# Patient Record
Sex: MALE | Race: WHITE | NOT HISPANIC OR LATINO | Employment: FULL TIME | URBAN - METROPOLITAN AREA
[De-identification: names, ages, dates, MRNs, and addresses within clinical notes are randomized per-mention and may not be internally consistent; named-entity substitution may affect disease eponyms.]

---

## 2018-10-05 ENCOUNTER — IMMUNIZATION (OUTPATIENT)
Dept: SOCIAL WORK | Facility: CLINIC | Age: 48
End: 2018-10-05
Payer: COMMERCIAL

## 2018-10-05 DIAGNOSIS — Z23 NEED FOR VACCINATION: ICD-10-CM

## 2018-10-05 PROCEDURE — 90471 IMMUNIZATION ADMIN: CPT | Performed by: REGISTERED NURSE

## 2018-10-05 PROCEDURE — 90686 IIV4 VACC NO PRSV 0.5 ML IM: CPT | Performed by: REGISTERED NURSE

## 2019-05-15 ENCOUNTER — OFFICE VISIT (OUTPATIENT)
Dept: URGENT CARE | Facility: PHYSICIAN GROUP | Age: 49
End: 2019-05-15
Payer: COMMERCIAL

## 2019-05-15 VITALS
TEMPERATURE: 99 F | HEIGHT: 67 IN | BODY MASS INDEX: 30.45 KG/M2 | DIASTOLIC BLOOD PRESSURE: 82 MMHG | OXYGEN SATURATION: 95 % | WEIGHT: 194 LBS | SYSTOLIC BLOOD PRESSURE: 124 MMHG | HEART RATE: 74 BPM

## 2019-05-15 DIAGNOSIS — J03.00 STREP TONSILLITIS: ICD-10-CM

## 2019-05-15 LAB
INT CON NEG: NORMAL
INT CON POS: NORMAL
S PYO AG THROAT QL: POSITIVE

## 2019-05-15 PROCEDURE — 99204 OFFICE O/P NEW MOD 45 MIN: CPT | Performed by: PHYSICIAN ASSISTANT

## 2019-05-15 PROCEDURE — 87880 STREP A ASSAY W/OPTIC: CPT | Performed by: PHYSICIAN ASSISTANT

## 2019-05-15 RX ORDER — AMOXICILLIN 875 MG/1
875 TABLET, COATED ORAL 2 TIMES DAILY
Qty: 20 TAB | Refills: 0 | Status: SHIPPED | OUTPATIENT
Start: 2019-05-15

## 2019-05-15 NOTE — PROGRESS NOTES
Chief Complaint   Patient presents with   • Pharyngitis     Body aches, headache, fatigued, ears are ringing x 2 days        HISTORY OF PRESENT ILLNESS: Patient is a 48 y.o. male who presents today for about 36 hours of worsening sore throat. Diffuse, aching and worse with swallowing.  He has had associated fatigue, body aches, chills, and malaise.  He has had some fever up to 101, most recently last night.  He has taken OTC with mild relief. No nausea, vomiting or rashes.  No cough.   No sick contacts.     Patient Active Problem List    Diagnosis Date Noted   • PVC (premature ventricular contraction) 09/22/2014   • Other chest pain 02/03/2014   • Palpitations 02/03/2014   • Essential hypertension, benign 02/03/2014   • Nonspecific abnormal electrocardiogram (ECG) (EKG) 02/03/2014       Allergies:Patient has no known allergies.    Current Outpatient Prescriptions Ordered in [facility]   Medication Sig Dispense Refill   • ibuprofen (MOTRIN) 200 MG TABS Take 200 mg by mouth every 6 hours as needed.     • Multiple Vitamin (MULTI-VITAMIN PO) Take  by mouth every 48 hours.     • GLUCOSAMINE-CHONDROITIN PO Take  by mouth every day.       No current [facility]-ordered facility-administered medications on file.        Past Medical History:   Diagnosis Date   • Hypertension    • Pain     neck and left elbow       Social History   Substance Use Topics   • Smoking status: Never Smoker   • Smokeless tobacco: Not on file   • Alcohol use 1.5 oz/week     3 drink(s) per week       No family status information on file.     Family History   Problem Relation Age of Onset   • Heart Attack Neg Hx        ROS:  Review of Systems   Constitutional: SEE HPI    HENT: SEE HPI   Eyes: Negative for blurred vision.   Respiratory: Negative for cough, sputum production, shortness of breath and wheezing.    Cardiovascular: Negative for chest pain, palpitations, orthopnea and leg swelling.   Gastrointestinal: Negative for heartburn, nausea, vomiting and  "abdominal pain.   .     Exam:  /82 (BP Location: Left arm, Patient Position: Supine, BP Cuff Size: Adult)   Pulse 74   Temp 37.2 °C (99 °F) (Temporal)   Ht 1.702 m (5' 7\")   Wt 88 kg (194 lb)   SpO2 95%   General:  Well nourished, well developed male in NAD  Head: Normocephalic/atraumatic.   Eyes: PERRLA, EOM within normal limits, no conjunctival injection, no scleral icterus, visual fields and acuity grossly intact.  Ears: Normal shape and symmetry, no tenderness, no discharge. External canals are without any significant edema or erythema. Tympanic membranes are without any inflammation, no effusion. Gross auditory acuity is intact  Nose: Symmetrical, sinuses without tenderness, no discharge.   Mouth: reasonable hygiene,  Moderate diffuse erythema, bilateral exudates, moderate bilateral tonsillar enlargement. Uvula midline.  No evidence of abscess  Neck: no masses, range of motion within normal limits, no tracheal deviation. Mild anterior cervical lymphadenopathy  Pulmonary: Normal respiratory effort, no wheezes, crackles, or rhonchi.  Cardiovascular: regular rate and rhythm without murmurs, rubs, or gallops.  Skin: No visible rashes or lesion. Warm, pink, dry.   Extremities: no clubbing, cyanosis, or edema.  Neuro: A&O x 3. Speech normal/clear.  Normal gait.   Psych: normal mood/affect      Assessment/Plan:  1. Strep tonsillitis  POCT Rapid Strep A    amoxicillin (AMOXIL) 875 MG tablet         -POCT strep -POS  -fluids emphasized. Alternating Tylenol/Motrin prn pain/inflammation/fever  -new tooth brush.    -RTC precautions discussed such as worsening sore throat despite abx, worsening fevers, increased difficulty swallowing or breathing, drooling, etc.        Supportive care, differential diagnoses, and indications for immediate follow-up discussed with patient.   Pathogenesis of diagnosis discussed including typical length and natural progression.   Instructed to return to clinic or nearest emergency " department for any change in condition, further concerns, or worsening of symptoms.  Patient states understanding of the plan of care and discharge instructions.  Instructed to make an appointment, for follow up, with their primary care provider.      Arline Duarte P.A.-C.

## 2021-07-06 ENCOUNTER — OFFICE VISIT (OUTPATIENT)
Dept: URGENT CARE | Facility: PHYSICIAN GROUP | Age: 51
End: 2021-07-06
Payer: COMMERCIAL

## 2021-07-06 VITALS
BODY MASS INDEX: 30.45 KG/M2 | SYSTOLIC BLOOD PRESSURE: 118 MMHG | DIASTOLIC BLOOD PRESSURE: 72 MMHG | RESPIRATION RATE: 18 BRPM | HEIGHT: 67 IN | TEMPERATURE: 97.3 F | WEIGHT: 194 LBS | HEART RATE: 91 BPM | OXYGEN SATURATION: 95 %

## 2021-07-06 DIAGNOSIS — J06.9 UPPER RESPIRATORY TRACT INFECTION, UNSPECIFIED TYPE: ICD-10-CM

## 2021-07-06 LAB
INT CON NEG: NEGATIVE
INT CON POS: POSITIVE
S PYO AG THROAT QL: NEGATIVE

## 2021-07-06 PROCEDURE — 87880 STREP A ASSAY W/OPTIC: CPT | Performed by: PHYSICIAN ASSISTANT

## 2021-07-06 PROCEDURE — 99213 OFFICE O/P EST LOW 20 MIN: CPT | Performed by: PHYSICIAN ASSISTANT

## 2021-07-06 RX ORDER — AZELASTINE 1 MG/ML
1 SPRAY, METERED NASAL 2 TIMES DAILY
Qty: 30 ML | Refills: 0 | Status: SHIPPED | OUTPATIENT
Start: 2021-07-06 | End: 2021-07-16

## 2021-07-06 ASSESSMENT — ENCOUNTER SYMPTOMS
COUGH: 1
SINUS PAIN: 1
SORE THROAT: 1
CHILLS: 0
FEVER: 0
SPUTUM PRODUCTION: 1
PALPITATIONS: 0
SHORTNESS OF BREATH: 0
HEADACHES: 1
WHEEZING: 0
HEMOPTYSIS: 0

## 2021-07-06 NOTE — PATIENT INSTRUCTIONS

## 2021-07-06 NOTE — PROGRESS NOTES
Subjective:   Ford Michel is a 51 y.o. male who presents for Sinus Pain (pt states febrile, sore throat,congestion, cough, mucus, sinus pressure x5 days)      Sinus Pain  This is a new problem. The current episode started in the past 7 days. The problem occurs constantly. Associated symptoms include congestion, coughing, headaches and a sore throat. Pertinent negatives include no chest pain, chills or fever. Nothing aggravates the symptoms. He has tried nothing for the symptoms.       Review of Systems   Constitutional: Positive for malaise/fatigue. Negative for chills and fever.   HENT: Positive for congestion, sinus pain and sore throat. Negative for ear pain.    Respiratory: Positive for cough and sputum production. Negative for hemoptysis, shortness of breath and wheezing.    Cardiovascular: Negative for chest pain and palpitations.   Neurological: Positive for headaches.   All other systems reviewed and are negative.      Medications:    • amoxicillin  • azelastine  • GLUCOSAMINE-CHONDROITIN PO  • ibuprofen Tabs  • MULTI-VITAMIN PO    Allergies: Patient has no known allergies.    Problem List: Ford Michel does not have any pertinent problems on file.    Surgical History:  Past Surgical History:   Procedure Laterality Date   • EXTENSOR TENDON REPAIR  4/26/2012    Performed by REYMUNDO HOLDER at [facility]   • LOOSE BODY REMOVAL  4/26/2012    Performed by REYMUNDO HOLDER at [facility]   • BLOCK TRIGGER POINT  2012   • OTHER  2009    right elbow surgery   • ELBOW ARTHROSCOPY         Past Social Hx: Ford Michel  reports that he has never smoked. He has never used smokeless tobacco. He reports current alcohol use of about 1.5 oz of alcohol per week. He reports that he does not use drugs.     Past Family Hx:  Ford Michel family history is not on file.     Problem list, medications, and allergies reviewed by myself today in Epic.     Objective:     Blood  "Pressure 118/72   Pulse 91   Temperature 36.3 °C (97.3 °F)   Respiration 18   Height 1.702 m (5' 7\")   Weight 88 kg (194 lb)   Oxygen Saturation 95%   Body Mass Index 30.38 kg/m²     Physical Exam  Vitals reviewed.   Constitutional:       General: He is not in acute distress.     Appearance: He is well-developed. He is not ill-appearing or toxic-appearing.      Interventions: He is not intubated.  HENT:      Head: Normocephalic and atraumatic.      Right Ear: Hearing, tympanic membrane, ear canal and external ear normal.      Left Ear: Hearing, tympanic membrane, ear canal and external ear normal.      Nose: Nose normal.      Mouth/Throat:      Pharynx: Uvula midline.   Eyes:      General: Lids are normal.      Conjunctiva/sclera: Conjunctivae normal.   Cardiovascular:      Rate and Rhythm: Regular rhythm.      Heart sounds: Normal heart sounds, S1 normal and S2 normal. No murmur heard.   No friction rub. No gallop.    Pulmonary:      Effort: Pulmonary effort is normal. No tachypnea, bradypnea, accessory muscle usage or respiratory distress. He is not intubated.      Breath sounds: Normal breath sounds. No decreased breath sounds, wheezing, rhonchi or rales.   Chest:      Chest wall: No tenderness.   Musculoskeletal:         General: Normal range of motion.      Cervical back: Full passive range of motion without pain, normal range of motion and neck supple.   Skin:     General: Skin is warm and dry.   Neurological:      Mental Status: He is alert and oriented to person, place, and time.   Psychiatric:         Speech: Speech normal.         Behavior: Behavior normal.         Thought Content: Thought content normal.         Judgment: Judgment normal.     Rapid Strep: NEG      Assessment/Plan:     Medical Decision Making/Comments     Pt is a  51 yr old male who presents for evaluation of URI symptoms.  Pt states cough, congestion and malaise for 5 days.  Pt denies fever or SOB.  Vital signs normal.  ENT exam is " unremarkable.  Lungs are clear to auscultation.   Heart sounds are normal.  Pt is most likely experiencing an upper respiratory infection.  Discussed at length that 90% of URI's are viral in etiology and only supportive care is indicated.      Diagnosis and associated orders     1. Upper respiratory tract infection, unspecified type  POCT Rapid Strep A    azelastine (ASTELIN) 137 MCG/SPRAY nasal spray   - Increase hydration  - Steam inhalation 20-30 min/day TID  - Saline irrigation  - Elevation of head at night  - Avoid cigarette smoke/fumes, caffeine and alcohol.  - NSAIDs/Acetaminophen PRN  - Decongestants: Pseudoephedrine HCl/Phenylephrine/Oxymetazoline PRN             Differential diagnosis, natural history, supportive care, and indications for immediate follow-up discussed.    Advised the patient to follow-up with the primary care physician for recheck, reevaluation, and consideration of further management.    Please note that this dictation was created using voice recognition software. I have made a reasonable attempt to correct obvious errors, but I expect that there are errors of grammar and possibly content that I did not discover before finalizing the note.

## 2021-07-06 NOTE — LETTER
July 6, 2021         Patient: Ford Michel   YOB: 1970   Date of Visit: 7/6/2021           To Whom it May Concern:    Ford Michel was seen in my clinic on 7/6/2021. Please excuse him from work on this day.  If you have any questions or concerns, please don't hesitate to call.        Sincerely,           Rinku Sibley P.A.-C.  Electronically Signed